# Patient Record
Sex: MALE | Race: WHITE | Employment: FULL TIME | URBAN - METROPOLITAN AREA
[De-identification: names, ages, dates, MRNs, and addresses within clinical notes are randomized per-mention and may not be internally consistent; named-entity substitution may affect disease eponyms.]

---

## 2018-05-21 PROCEDURE — 86160 COMPLEMENT ANTIGEN: CPT | Performed by: INTERNAL MEDICINE

## 2018-05-21 PROCEDURE — 83516 IMMUNOASSAY NONANTIBODY: CPT | Performed by: INTERNAL MEDICINE

## 2018-05-21 PROCEDURE — 86235 NUCLEAR ANTIGEN ANTIBODY: CPT | Performed by: INTERNAL MEDICINE

## 2018-05-21 PROCEDURE — 86162 COMPLEMENT TOTAL (CH50): CPT | Performed by: INTERNAL MEDICINE

## 2018-05-21 PROCEDURE — 86200 CCP ANTIBODY: CPT | Performed by: INTERNAL MEDICINE

## 2018-05-21 PROCEDURE — 81003 URINALYSIS AUTO W/O SCOPE: CPT | Performed by: INTERNAL MEDICINE

## 2018-11-06 PROCEDURE — 82570 ASSAY OF URINE CREATININE: CPT | Performed by: INTERNAL MEDICINE

## 2018-11-06 PROCEDURE — 82043 UR ALBUMIN QUANTITATIVE: CPT | Performed by: INTERNAL MEDICINE

## 2018-11-06 PROCEDURE — 83516 IMMUNOASSAY NONANTIBODY: CPT | Performed by: INTERNAL MEDICINE

## 2018-11-06 PROCEDURE — 81003 URINALYSIS AUTO W/O SCOPE: CPT | Performed by: INTERNAL MEDICINE

## 2018-12-06 ENCOUNTER — LAB ENCOUNTER (OUTPATIENT)
Dept: LAB | Age: 58
End: 2018-12-06
Payer: COMMERCIAL

## 2018-12-06 DIAGNOSIS — Z00.00 ROUTINE GENERAL MEDICAL EXAMINATION AT A HEALTH CARE FACILITY: Primary | ICD-10-CM

## 2018-12-06 PROCEDURE — 80053 COMPREHEN METABOLIC PANEL: CPT

## 2018-12-06 PROCEDURE — 80061 LIPID PANEL: CPT

## 2020-01-09 ENCOUNTER — OFFICE VISIT (OUTPATIENT)
Dept: NEUROLOGY | Facility: CLINIC | Age: 60
End: 2020-01-09
Payer: COMMERCIAL

## 2020-01-09 VITALS
BODY MASS INDEX: 36 KG/M2 | RESPIRATION RATE: 16 BRPM | SYSTOLIC BLOOD PRESSURE: 116 MMHG | HEART RATE: 72 BPM | WEIGHT: 248 LBS | DIASTOLIC BLOOD PRESSURE: 70 MMHG

## 2020-01-09 DIAGNOSIS — M62.838 MUSCLE SPASM: ICD-10-CM

## 2020-01-09 DIAGNOSIS — M79.10 MUSCLE PAIN: Primary | ICD-10-CM

## 2020-01-09 DIAGNOSIS — R53.1 WEAKNESS: ICD-10-CM

## 2020-01-09 PROCEDURE — 99205 OFFICE O/P NEW HI 60 MIN: CPT | Performed by: OTHER

## 2020-01-09 RX ORDER — LISINOPRIL 10 MG/1
10 TABLET ORAL DAILY
COMMUNITY

## 2020-01-09 RX ORDER — GLIMEPIRIDE 4 MG/1
8 TABLET ORAL
COMMUNITY

## 2020-01-09 NOTE — PROGRESS NOTES
Patient is having muscle pain in the torso region and the upper and lower extremities. Patient is having burning sensation when over worked. This started about 3-4 years ago. On and off tingling in the legs and foot.

## 2020-01-09 NOTE — PROGRESS NOTES
[facility] [address]   Neurology; INITIAL CLINIC VISIT  2020, 8:40 AM     Patricia Sheppard Patient Status:  No patient class for patient encounter    1960 MRN [id number]   Location ED [facility], [facility] Demetra Ruiz Tab Take 20 mg by mouth nightly. • aspirin 81 MG Oral Tab Take 81 mg by mouth daily.            REVIEW OF SYSTEMS:    GENERAL HEALTH:  feels well, calm, normal appetite,   EYES: no visual complaints or deficits  HEENT: denies nasal congestion, sinus ulises CN III, IV, VI:  Horrizontal and vertical movements normal.  Normal pursuit and saccades.                             No nystagmus, no ptosis       CN V: Masseters strong, Facial sensations normal,        CN  VII:  Symmetric facial movement       CN VIII: and legs,    labs ordered,       Return in about 2 months (around 3/9/2020). We discussed in depth regarding diagnosis, prognosis, treatment. Side effect of medications were discussed in details.  The patient was  given ample opportunity to ask questions

## 2020-01-21 ENCOUNTER — TELEPHONE (OUTPATIENT)
Dept: NEUROLOGY | Facility: CLINIC | Age: 60
End: 2020-01-21

## 2020-01-27 ENCOUNTER — TELEPHONE (OUTPATIENT)
Dept: NEUROLOGY | Facility: CLINIC | Age: 60
End: 2020-01-27

## 2020-01-30 NOTE — TELEPHONE ENCOUNTER
Lab results reviewed and signed by Dr Allen Sung. Patient notified fo lab results and requests copy of results be faxed to his Endocrinologist (Has bina't tomorrow). Lab results faxed with fax confirmation receive.

## 2020-02-11 ENCOUNTER — OFFICE VISIT (OUTPATIENT)
Dept: ELECTROPHYSIOLOGY | Facility: HOSPITAL | Age: 60
End: 2020-02-11
Attending: Other
Payer: COMMERCIAL

## 2020-02-11 DIAGNOSIS — M79.10 MUSCLE PAIN: ICD-10-CM

## 2020-02-11 PROCEDURE — 95912 NRV CNDJ TEST 11-12 STUDIES: CPT | Performed by: OTHER

## 2020-02-11 PROCEDURE — 95886 MUSC TEST DONE W/N TEST COMP: CPT | Performed by: OTHER

## 2020-02-11 PROCEDURE — 95885 MUSC TST DONE W/NERV TST LIM: CPT | Performed by: OTHER

## 2020-02-11 NOTE — PROCEDURES
[facility]  Nerve Conduction & EMG Report                      Jordan Mejia, [address]   EMG department   [address] [facility], [address] [place] [address] Rd  [phone]          Patient name: Phylicia Bagleyland  Date of dottie suggestive evidence of a myopathy, however this cannot be completely excluded either. 3.  There is no clear electrophysiologic evidence of a myopathy. 4.  There is no clear evidence of a cervical or lumbar radiculopathy.       Bladimir Albarado,   Neurolo

## 2020-02-12 ENCOUNTER — TELEPHONE (OUTPATIENT)
Dept: NEUROLOGY | Facility: CLINIC | Age: 60
End: 2020-02-12

## 2020-02-12 NOTE — TELEPHONE ENCOUNTER
Relayed results to pt.  Verbalized understanding, no further questions.      ----- Message from Kristofer Cornejo MD sent at 2/11/2020  5:30 PM CST -----  EMG is normal,

## 2020-03-05 ENCOUNTER — OFFICE VISIT (OUTPATIENT)
Dept: NEUROLOGY | Facility: CLINIC | Age: 60
End: 2020-03-05
Payer: COMMERCIAL

## 2020-03-05 VITALS
HEART RATE: 64 BPM | SYSTOLIC BLOOD PRESSURE: 102 MMHG | WEIGHT: 240 LBS | DIASTOLIC BLOOD PRESSURE: 64 MMHG | RESPIRATION RATE: 16 BRPM | BODY MASS INDEX: 34 KG/M2

## 2020-03-05 DIAGNOSIS — G57.10 MERALGIA PARAESTHETICA, UNSPECIFIED LATERALITY: ICD-10-CM

## 2020-03-05 DIAGNOSIS — R53.1 WEAKNESS: Primary | ICD-10-CM

## 2020-03-05 DIAGNOSIS — M79.10 MUSCLE PAIN: ICD-10-CM

## 2020-03-05 DIAGNOSIS — M62.838 MUSCLE SPASM: ICD-10-CM

## 2020-03-05 PROCEDURE — 99214 OFFICE O/P EST MOD 30 MIN: CPT | Performed by: OTHER

## 2020-03-05 NOTE — PROGRESS NOTES
[facility] [address]   Neurology; follow up  CLINIC VISIT  3/5/2020    [facility] Patient Status:  No patient class for patient encounter    1960 MRN [id number]   Location [address] [place] [address], [address] [place] [address], [address] [place] [address] CHLOE S 31G X 5 MM Does not apply Misc Use with Victoza pen daily. 100 each 1   • Loratadine (CLARITIN OR) as needed. • Glucose Blood (SUNG CONTOUR NEXT TEST) In Vitro Strip Use to test blood sugar daily.  100 each 1   • Atorvastatin Calcium 20 MG Oral Tab Wilman Siu place, Mentally appropriate  for age;  Language and speech: language is intact in expression and comprehension, no dysarthria, voice is normal in volume, follow commends well;  Memory and comprehension:  MMSE is normal,   Cranial Nerves       CN II:  [facility]       Problem List Items Addressed This Visit     Meralgia paraesthetica, unspecified laterality    Muscle pain    Muscle spasm    Weakness - Primary    Relevant Orders    OP REFERRAL TO [facility] & [facility]

## 2020-07-26 ENCOUNTER — WALK IN (OUTPATIENT)
Dept: URGENT CARE | Age: 60
End: 2020-07-26
Attending: EMERGENCY MEDICINE

## 2020-07-26 VITALS
TEMPERATURE: 97.6 F | SYSTOLIC BLOOD PRESSURE: 122 MMHG | OXYGEN SATURATION: 98 % | DIASTOLIC BLOOD PRESSURE: 74 MMHG | WEIGHT: 244 LBS | HEART RATE: 76 BPM | RESPIRATION RATE: 14 BRPM

## 2020-07-26 DIAGNOSIS — L23.7 ALLERGIC DERMATITIS DUE TO RHUS TOXICODENDRON: Primary | ICD-10-CM

## 2020-07-26 PROCEDURE — 99202 OFFICE O/P NEW SF 15 MIN: CPT

## 2020-07-26 RX ORDER — LISINOPRIL 10 MG/1
TABLET ORAL
COMMUNITY
Start: 2020-06-25

## 2020-07-26 RX ORDER — CEPHALEXIN 500 MG/1
CAPSULE ORAL
COMMUNITY
Start: 2020-07-17

## 2020-07-26 RX ORDER — PREDNISONE 20 MG/1
TABLET ORAL
Qty: 24 TABLET | Refills: 0 | Status: SHIPPED | OUTPATIENT
Start: 2020-07-26 | End: 2020-08-07

## 2020-07-26 RX ORDER — HYDROXYZINE HYDROCHLORIDE 25 MG/1
25 TABLET, FILM COATED ORAL
Qty: 10 TABLET | Refills: 0 | Status: SHIPPED | OUTPATIENT
Start: 2020-07-26 | End: 2020-08-05

## 2020-07-26 RX ORDER — GLIMEPIRIDE 4 MG/1
TABLET ORAL
COMMUNITY
Start: 2020-05-03

## 2020-07-26 RX ORDER — SITAGLIPTIN 100 MG/1
TABLET, FILM COATED ORAL
COMMUNITY
Start: 2020-05-07

## 2020-07-26 RX ORDER — DAPAGLIFLOZIN 10 MG/1
TABLET, FILM COATED ORAL
COMMUNITY
Start: 2020-06-25

## 2020-07-26 RX ORDER — ATORVASTATIN CALCIUM 20 MG/1
TABLET, FILM COATED ORAL
COMMUNITY
Start: 2020-06-07

## 2020-07-26 RX ORDER — TRIAMCINOLONE ACETONIDE 1 MG/G
CREAM TOPICAL 3 TIMES DAILY
Qty: 30 G | Refills: 1 | Status: SHIPPED | OUTPATIENT
Start: 2020-07-26 | End: 2020-08-09

## 2020-10-29 ENCOUNTER — TELEPHONE (OUTPATIENT)
Dept: NEUROLOGY | Facility: CLINIC | Age: 60
End: 2020-10-29

## 2022-08-24 ENCOUNTER — HOSPITAL ENCOUNTER (OUTPATIENT)
Dept: GENERAL RADIOLOGY | Age: 62
Discharge: HOME | End: 2022-08-24

## 2022-08-24 DIAGNOSIS — R10.9 STOMACH ACHE: ICD-10-CM

## 2022-08-24 PROCEDURE — 74018 RADEX ABDOMEN 1 VIEW: CPT

## (undated) NOTE — MR AVS SNAPSHOT
After Visit Summary   2/11/2020    Pamela Forrester    MRN: [id number]           Visit Information     Date & Time  2/11/2020  1:00 PM Provider  Katarzyna Morales, [address] [place] [address] Neurodiagnostics Dept.  Phone  [phone]      All Injury & illness are never convenient. If you are dealing with a   non-emergency, consider your options before heading to an ER. VIDEO VISITS  Visit face-to-face with a [facility] physician or   CHARI using your mobile device or computer   using [employer].     e-